# Patient Record
Sex: FEMALE | Race: WHITE | Employment: PART TIME | URBAN - METROPOLITAN AREA
[De-identification: names, ages, dates, MRNs, and addresses within clinical notes are randomized per-mention and may not be internally consistent; named-entity substitution may affect disease eponyms.]

---

## 2018-09-04 ENCOUNTER — APPOINTMENT (OUTPATIENT)
Dept: GENERAL RADIOLOGY | Age: 28
End: 2018-09-04
Payer: MEDICAID

## 2018-09-04 ENCOUNTER — HOSPITAL ENCOUNTER (EMERGENCY)
Age: 28
Discharge: HOME | End: 2018-09-04
Payer: MEDICAID

## 2018-09-04 VITALS
HEART RATE: 90 BPM | BODY MASS INDEX: 25.69 KG/M2 | OXYGEN SATURATION: 98 % | SYSTOLIC BLOOD PRESSURE: 141 MMHG | DIASTOLIC BLOOD PRESSURE: 91 MMHG | RESPIRATION RATE: 15 BRPM | HEIGHT: 63 IN | TEMPERATURE: 98.2 F | WEIGHT: 145 LBS
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
## 2018-09-04 DIAGNOSIS — M54.2 NECK PAIN: ICD-10-CM

## 2018-09-04 PROCEDURE — 6370000000 HC RX 637 (ALT 250 FOR IP): Performed by: PHYSICIAN ASSISTANT

## 2018-09-04 PROCEDURE — 99283 EMERGENCY DEPT VISIT LOW MDM: CPT

## 2018-09-04 PROCEDURE — 72040 X-RAY EXAM NECK SPINE 2-3 VW: CPT

## 2018-09-04 RX ORDER — CYCLOBENZAPRINE HCL 10 MG
10 TABLET ORAL ONCE
Status: DISCONTINUED | OUTPATIENT
Start: 2018-09-04 | End: 2018-09-04

## 2018-09-04 RX ORDER — METHOCARBAMOL 750 MG/1
750 TABLET, FILM COATED ORAL 4 TIMES DAILY
Qty: 20 TABLET | Refills: 0 | Status: SHIPPED | OUTPATIENT
Start: 2018-09-04 | End: 2018-09-14

## 2018-09-04 RX ORDER — IBUPROFEN 800 MG/1
800 TABLET ORAL EVERY 8 HOURS PRN
Qty: 30 TABLET | Refills: 0 | Status: SHIPPED | OUTPATIENT
Start: 2018-09-04 | End: 2020-08-15

## 2018-09-04 RX ORDER — TRAMADOL HYDROCHLORIDE 50 MG/1
50 TABLET ORAL ONCE
Status: COMPLETED | OUTPATIENT
Start: 2018-09-04 | End: 2018-09-04

## 2018-09-04 RX ORDER — METHOCARBAMOL 750 MG/1
750 TABLET, FILM COATED ORAL ONCE
Status: COMPLETED | OUTPATIENT
Start: 2018-09-04 | End: 2018-09-04

## 2018-09-04 RX ADMIN — METHOCARBAMOL 750 MG: 750 TABLET ORAL at 20:57

## 2018-09-04 RX ADMIN — TRAMADOL HYDROCHLORIDE 50 MG: 50 TABLET, FILM COATED ORAL at 20:58

## 2018-09-04 ASSESSMENT — PAIN DESCRIPTION - FREQUENCY: FREQUENCY: CONTINUOUS

## 2018-09-04 ASSESSMENT — PAIN DESCRIPTION - ONSET: ONSET: ON-GOING

## 2018-09-04 ASSESSMENT — PAIN DESCRIPTION - DESCRIPTORS: DESCRIPTORS: ACHING

## 2018-09-04 ASSESSMENT — PAIN DESCRIPTION - PROGRESSION: CLINICAL_PROGRESSION: NOT CHANGED

## 2018-09-04 ASSESSMENT — PAIN DESCRIPTION - LOCATION: LOCATION: HEAD;NECK

## 2018-09-04 ASSESSMENT — PAIN SCALES - GENERAL
PAINLEVEL_OUTOF10: 10
PAINLEVEL_OUTOF10: 10

## 2018-09-04 ASSESSMENT — PAIN DESCRIPTION - PAIN TYPE: TYPE: ACUTE PAIN
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
## 2018-09-10 ENCOUNTER — HOSPITAL ENCOUNTER (OUTPATIENT)
Dept: GENERAL RADIOLOGY | Age: 28
Discharge: HOME | End: 2018-09-10
Payer: MEDICAID

## 2018-09-10 ENCOUNTER — HOSPITAL ENCOUNTER (OUTPATIENT)
Age: 28
Discharge: HOME | End: 2018-09-10
Payer: MEDICAID

## 2018-09-10 DIAGNOSIS — M25.532 WRIST PAIN, LEFT: ICD-10-CM

## 2018-09-10 PROCEDURE — 73110 X-RAY EXAM OF WRIST: CPT

## 2018-09-11 ENCOUNTER — HOSPITAL ENCOUNTER (EMERGENCY)
Age: 28
Discharge: HOME | End: 2018-09-11
Payer: MEDICAID

## 2018-09-11 VITALS
OXYGEN SATURATION: 100 % | TEMPERATURE: 98.4 F | DIASTOLIC BLOOD PRESSURE: 91 MMHG | RESPIRATION RATE: 16 BRPM | HEIGHT: 63 IN | HEART RATE: 92 BPM | WEIGHT: 170 LBS | SYSTOLIC BLOOD PRESSURE: 132 MMHG | BODY MASS INDEX: 30.12 KG/M2

## 2018-09-11 DIAGNOSIS — M25.532 LEFT WRIST PAIN: Primary | ICD-10-CM

## 2018-09-11 PROCEDURE — 6370000000 HC RX 637 (ALT 250 FOR IP): Performed by: PHYSICIAN ASSISTANT

## 2018-09-11 PROCEDURE — 99283 EMERGENCY DEPT VISIT LOW MDM: CPT

## 2018-09-11 RX ORDER — HYDROCODONE BITARTRATE AND ACETAMINOPHEN 5; 325 MG/1; MG/1
1 TABLET ORAL ONCE
Status: COMPLETED | OUTPATIENT
Start: 2018-09-11 | End: 2018-09-11

## 2018-09-11 RX ORDER — NAPROXEN 500 MG/1
500 TABLET ORAL 2 TIMES DAILY
Qty: 20 TABLET | Refills: 0 | Status: SHIPPED | OUTPATIENT
Start: 2018-09-11 | End: 2020-12-19

## 2018-09-11 RX ADMIN — HYDROCODONE BITARTRATE AND ACETAMINOPHEN 1 TABLET: 5; 325 TABLET ORAL at 20:59

## 2018-09-11 ASSESSMENT — PAIN SCALES - GENERAL
PAINLEVEL_OUTOF10: 10
PAINLEVEL_OUTOF10: 10

## 2018-09-11 ASSESSMENT — PAIN DESCRIPTION - ORIENTATION: ORIENTATION: LEFT

## 2018-09-11 ASSESSMENT — PAIN DESCRIPTION - LOCATION: LOCATION: WRIST

## 2018-09-11 ASSESSMENT — PAIN DESCRIPTION - DESCRIPTORS: DESCRIPTORS: ACHING

## 2018-09-11 ASSESSMENT — PAIN DESCRIPTION - PAIN TYPE: TYPE: ACUTE PAIN

## 2018-09-12 NOTE — ED PROVIDER NOTES
Evaluated by advanced practice provider        [facility] ED  eMERGENCY dEPARTMENT eNCOUnter        Pt Name: Jacobo Sanchez  MRN: [id number]  [place] 1990  Date of evaluation: 9/11/2018  Provider: Claudette Montes De Oca PA-C  PCP: SHON Salguero CNP  ED Attending: No att. providers found    [facility]       Chief Complaint   Patient presents with    Wrist Pain     patient states that she has had wrist pain since saturday. patient states that she was draining noodles and twisted her wrsit wrong. patient states that she had x-ray done here yesturday       HISTORY OF PRESENT ILLNESS   (Location/Symptom, Timing/Onset, Context/Setting, Quality, Duration, Modifying Factors, Severity)  Note limiting factors. Jacobo Sanchez is a 25 y.o. female presents to the emergency department with left wrist pain for the past 3 days. The patient states that she was draining some noodles and twisted her wrist well. She was able to get hold of her PCP yesterday and had an x-ray of her left wrist done here that was unremarkable. She states that her pain is a 10 out of 10 sharp burning pain. She states that she was given anti-inflammatories and muscle relaxants with minimal relief. She states that she is having some tingling in her left middle finger and left thumb at times. Denies any weakness, but states that her pain is worse with movement. Nursing Notes were all reviewed and agreed with or any disagreements were addressed  in the HPI. REVIEW OF SYSTEMS    (2-9 systems for level 4, 10 or more for level 5)     Review of Systems    Positives and Pertinent negatives as per HPI. Except as noted above in the ROS, all other systems were reviewed and negative.        PAST MEDICAL HISTORY     Past Medical History:   Diagnosis Date    Abnormal Pap smear     2010, normal since    Anemia     Anemia     Ankle swelling     Anxiety disorder     Chest pain     Depression     Fatigue    

## 2019-12-18 ENCOUNTER — HOSPITAL ENCOUNTER (OUTPATIENT)
Dept: ULTRASOUND IMAGING | Age: 29
Discharge: HOME | End: 2019-12-18
Payer: MEDICAID

## 2019-12-18 DIAGNOSIS — R10.32 LEFT LOWER QUADRANT PAIN: ICD-10-CM

## 2019-12-18 PROCEDURE — 76856 US EXAM PELVIC COMPLETE: CPT

## 2020-08-15 ENCOUNTER — APPOINTMENT (OUTPATIENT)
Dept: CT IMAGING | Age: 30
End: 2020-08-15
Payer: MEDICAID

## 2020-08-15 ENCOUNTER — HOSPITAL ENCOUNTER (EMERGENCY)
Age: 30
Discharge: HOME | End: 2020-08-15
Attending: EMERGENCY MEDICINE
Payer: MEDICAID

## 2020-08-15 VITALS
WEIGHT: 170 LBS | OXYGEN SATURATION: 100 % | RESPIRATION RATE: 20 BRPM | HEIGHT: 64 IN | TEMPERATURE: 98.5 F | HEART RATE: 78 BPM | DIASTOLIC BLOOD PRESSURE: 67 MMHG | BODY MASS INDEX: 29.02 KG/M2 | SYSTOLIC BLOOD PRESSURE: 106 MMHG

## 2020-08-15 PROCEDURE — 96374 THER/PROPH/DIAG INJ IV PUSH: CPT

## 2020-08-15 PROCEDURE — 96375 TX/PRO/DX INJ NEW DRUG ADDON: CPT

## 2020-08-15 PROCEDURE — 6360000002 HC RX W HCPCS: Performed by: EMERGENCY MEDICINE

## 2020-08-15 PROCEDURE — 2580000003 HC RX 258: Performed by: EMERGENCY MEDICINE

## 2020-08-15 PROCEDURE — 6370000000 HC RX 637 (ALT 250 FOR IP): Performed by: EMERGENCY MEDICINE

## 2020-08-15 PROCEDURE — 72131 CT LUMBAR SPINE W/O DYE: CPT

## 2020-08-15 PROCEDURE — 99283 EMERGENCY DEPT VISIT LOW MDM: CPT

## 2020-08-15 RX ORDER — PREDNISONE 20 MG/1
TABLET ORAL
Qty: 21 TABLET | Refills: 0 | Status: SHIPPED | OUTPATIENT
Start: 2020-08-15 | End: 2022-07-16

## 2020-08-15 RX ORDER — 0.9 % SODIUM CHLORIDE 0.9 %
1000 INTRAVENOUS SOLUTION INTRAVENOUS ONCE
Status: COMPLETED | OUTPATIENT
Start: 2020-08-15 | End: 2020-08-15

## 2020-08-15 RX ORDER — KETOROLAC TROMETHAMINE 30 MG/ML
15 INJECTION, SOLUTION INTRAMUSCULAR; INTRAVENOUS ONCE
Status: COMPLETED | OUTPATIENT
Start: 2020-08-15 | End: 2020-08-15

## 2020-08-15 RX ORDER — ONDANSETRON 2 MG/ML
8 INJECTION INTRAMUSCULAR; INTRAVENOUS ONCE
Status: COMPLETED | OUTPATIENT
Start: 2020-08-15 | End: 2020-08-15

## 2020-08-15 RX ORDER — KETOROLAC TROMETHAMINE 10 MG/1
10 TABLET, FILM COATED ORAL EVERY 6 HOURS PRN
Qty: 20 TABLET | Refills: 0 | Status: SHIPPED | OUTPATIENT
Start: 2020-08-15

## 2020-08-15 RX ORDER — DEXAMETHASONE SODIUM PHOSPHATE 4 MG/ML
8 INJECTION, SOLUTION INTRA-ARTICULAR; INTRALESIONAL; INTRAMUSCULAR; INTRAVENOUS; SOFT TISSUE ONCE
Status: COMPLETED | OUTPATIENT
Start: 2020-08-15 | End: 2020-08-15

## 2020-08-15 RX ORDER — HYDROCODONE BITARTRATE AND ACETAMINOPHEN 5; 325 MG/1; MG/1
1 TABLET ORAL EVERY 6 HOURS PRN
Qty: 12 TABLET | Refills: 0 | Status: SHIPPED | OUTPATIENT
Start: 2020-08-15 | End: 2020-08-18

## 2020-08-15 RX ORDER — DIAZEPAM 5 MG/1
10 TABLET ORAL ONCE
Status: COMPLETED | OUTPATIENT
Start: 2020-08-15 | End: 2020-08-15

## 2020-08-15 RX ADMIN — KETOROLAC TROMETHAMINE 15 MG: 30 INJECTION, SOLUTION INTRAMUSCULAR at 14:30

## 2020-08-15 RX ADMIN — DEXAMETHASONE SODIUM PHOSPHATE 8 MG: 4 INJECTION, SOLUTION INTRAMUSCULAR; INTRAVENOUS at 15:31

## 2020-08-15 RX ADMIN — ONDANSETRON 8 MG: 2 INJECTION INTRAMUSCULAR; INTRAVENOUS at 14:30

## 2020-08-15 RX ADMIN — DIAZEPAM 10 MG: 5 TABLET ORAL at 14:30

## 2020-08-15 RX ADMIN — SODIUM CHLORIDE 1000 ML: 9 INJECTION, SOLUTION INTRAVENOUS at 14:34

## 2020-08-15 ASSESSMENT — PAIN SCALES - GENERAL
PAINLEVEL_OUTOF10: 10
PAINLEVEL_OUTOF10: 6
PAINLEVEL_OUTOF10: 10
PAINLEVEL_OUTOF10: 9

## 2020-08-15 ASSESSMENT — PAIN - FUNCTIONAL ASSESSMENT: PAIN_FUNCTIONAL_ASSESSMENT: 0-10
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
## 2020-08-15 NOTE — ED NOTES
All discharge paperwork and follow-up instructions reviewed with pt. Pt verbalized understanding.  Pt discharged via wheelchair in stable condition to private vehicle with Chrissie Méndez RN  08/15/20 [id number]

## 2020-12-19 ENCOUNTER — HOSPITAL ENCOUNTER (EMERGENCY)
Age: 30
Discharge: HOME | End: 2020-12-19
Payer: MEDICAID

## 2020-12-19 VITALS
RESPIRATION RATE: 16 BRPM | WEIGHT: 168 LBS | OXYGEN SATURATION: 99 % | HEIGHT: 63 IN | TEMPERATURE: 98.4 F | HEART RATE: 99 BPM | DIASTOLIC BLOOD PRESSURE: 89 MMHG | BODY MASS INDEX: 29.77 KG/M2 | SYSTOLIC BLOOD PRESSURE: 130 MMHG

## 2020-12-19 PROCEDURE — 6370000000 HC RX 637 (ALT 250 FOR IP): Performed by: NURSE PRACTITIONER

## 2020-12-19 PROCEDURE — 99284 EMERGENCY DEPT VISIT MOD MDM: CPT

## 2020-12-19 RX ORDER — SULFAMETHOXAZOLE AND TRIMETHOPRIM 800; 160 MG/1; MG/1
1 TABLET ORAL ONCE
Status: COMPLETED | OUTPATIENT
Start: 2020-12-19 | End: 2020-12-19

## 2020-12-19 RX ORDER — AMOXICILLIN AND CLAVULANATE POTASSIUM 875; 125 MG/1; MG/1
1 TABLET, FILM COATED ORAL ONCE
Status: COMPLETED | OUTPATIENT
Start: 2020-12-19 | End: 2020-12-19

## 2020-12-19 RX ORDER — AMOXICILLIN AND CLAVULANATE POTASSIUM 875; 125 MG/1; MG/1
1 TABLET, FILM COATED ORAL 2 TIMES DAILY
Qty: 20 TABLET | Refills: 0 | Status: SHIPPED | OUTPATIENT
Start: 2020-12-19 | End: 2020-12-29

## 2020-12-19 RX ORDER — SULFAMETHOXAZOLE AND TRIMETHOPRIM 800; 160 MG/1; MG/1
1 TABLET ORAL 2 TIMES DAILY
Qty: 20 TABLET | Refills: 0 | Status: SHIPPED | OUTPATIENT
Start: 2020-12-19 | End: 2020-12-29

## 2020-12-19 RX ADMIN — AMOXICILLIN AND CLAVULANATE POTASSIUM 1 TABLET: 875; 125 TABLET, FILM COATED ORAL at 22:49

## 2020-12-19 RX ADMIN — SULFAMETHOXAZOLE AND TRIMETHOPRIM 1 TABLET: 800; 160 TABLET ORAL at 22:49

## 2020-12-19 ASSESSMENT — PAIN SCALES - GENERAL: PAINLEVEL_OUTOF10: 9

## 2020-12-20 NOTE — ED PROVIDER NOTES
 CYST REMOVAL Right     breast    HYSTERECTOMY, VAGINAL  3/23/16    total vaginal hysterectomy    LEEP  1/14/16    TUBAL LIGATION         CURRENT MEDICATIONS  (may include discharge medications prescribed in the ED)  Current Outpatient Rx   Medication Sig Dispense Refill    amoxicillin-clavulanate (AUGMENTIN) 875-125 MG per tablet Take 1 tablet by mouth 2 times daily for 10 days 20 tablet 0    sulfamethoxazole-trimethoprim (BACTRIM DS) 800-160 MG per tablet Take 1 tablet by mouth 2 times daily for 10 days 20 tablet 0    naproxen (NAPROSYN) 500 MG tablet Take 1 tablet by mouth 2 times daily for 20 doses 20 tablet 0    predniSONE (DELTASONE) 20 MG tablet Take 3 tabs by mouth for three days (days 1-3). Take 2 tabs by mouth for three days (days 4-6)  Take 1 tab by mouth for four days (days 7-10)  Take 0.5 tabs by mouth for four days (days 11-14) 21 tablet 0    ketorolac (TORADOL) 10 MG tablet Take 1 tablet by mouth every 6 hours as needed for Pain Do not take ibuprofen (Motrin or Advil) or naproxen (Aleve) while taking this medication.  20 tablet 0       ALLERGIES    No Known Allergies    SOCIAL & FAMILY HISTORY    Social History     Socioeconomic History    Marital status: Legally      Spouse name: None    Number of children: None    Years of education: None    Highest education level: None   Occupational History    None   Social Needs    Financial resource strain: None    Food insecurity     Worry: None     Inability: None    Transportation needs     Medical: None     Non-medical: None   Tobacco Use    Smoking status: Current Every Day Smoker     Packs/day: 0.50     Years: 7.00     Pack years: 3.50     Types: Cigarettes    Smokeless tobacco: Never Used    Tobacco comment: tried chantix, not interested in quitting,    Substance and Sexual Activity    Alcohol use: Yes     Comment: 1-2 drinks per year    Drug use: No    Sexual activity: Yes     Partners: Male   Lifestyle    Physical activity     Days per week: None     Minutes per session: None    Stress: None   Relationships    Social connections     Talks on phone: None     Gets together: None     Attends [employer] service: None     Active member of club or organization: None     Attends meetings of clubs or organizations: None     Relationship status: None    Intimate partner violence     Fear of current or ex partner: None     Emotionally abused: None     Physically abused: None     Forced sexual activity: None   Other Topics Concern    None   Social History Narrative    None     Family History   Problem Relation Age of Onset    Diabetes Mother     High Blood Pressure Mother     Stroke Mother     Asthma Sister     Arthritis Maternal Grandmother     Cancer Maternal Grandmother         breast    Arthritis Maternal Grandfather     Arthritis Paternal Grandmother     Arthritis Paternal Grandfather     Cancer Paternal Grandfather     Mental Illness Father        PHYSICAL EXAM    VITAL SIGNS: /89   Pulse 99   Temp 98.4 °F (36.9 °C) (Oral)   Resp 16   Ht 5' 3\" (1.6 m)   Wt 168 lb (76.2 kg)   LMP 03/16/2016   SpO2 99%   BMI 29.76 kg/m²   Constitutional:  Well developed, well nourished, no acute distress  HENT:  Atraumatic, no facial or lip swelling  Oral:  No tongue swelling, airway patent  Neck: no swelling  Respiratory:  No respiratory distress, breathing comfortably  Cardiovascular:  no JVD   Musculoskeletal:  No edema, no acute deformities  Vascular: left pedal pulse 2+  Integument:  + 1 cm area of tenderness, induration, and fluctuance located left buttock into the gluteal celft, immediate area of surrounding erythema    RADIOLOGY  No orders to display       PROCEDURE  Deferred, already spontaneously ruptured and drained      ED COURSE & MEDICAL DECISION MAKING    See chart for details of medications prescribed.     Vitals:    12/19/20 2210 12/19/20 2220   BP:  130/89   Pulse: 109 99   Resp:  16   Temp:  98.4 °F 03/16/2016, SpO2 99 %, not currently breastfeeding. I instructed the patient to follow up in 2 days for wound recheck. The patient verbalized understanding, they are in agreement with the plan of discharge and have no further questions or concerns. FINAL IMPRESSION    1.  Abscess of buttock, left        PLAN  Discharge with close outpatient follow-up (see EMR)       (Please note that this note was completed with a voice recognition program.  Every attempt was made to edit the dictations, but inevitably there remain words that are mis-transcribed.)         Haven Hebert, SHON - CNP  12/19/20 0945

## 2020-12-20 NOTE — ED NOTES
Pt scripts x2 instructed to follow up with PCP for wound recheck. Assessed per Rhys Kerr NP.      Elio Avendaño LPN  37/53/65 [id number]

## 2022-07-16 ENCOUNTER — HOSPITAL ENCOUNTER (EMERGENCY)
Age: 32
Discharge: HOME | End: 2022-07-17
Payer: MEDICAID

## 2022-07-16 ENCOUNTER — APPOINTMENT (OUTPATIENT)
Dept: GENERAL RADIOLOGY | Age: 32
End: 2022-07-16
Payer: MEDICAID

## 2022-07-16 DIAGNOSIS — M25.511 ACUTE PAIN OF RIGHT SHOULDER: Primary | ICD-10-CM

## 2022-07-16 PROCEDURE — 73030 X-RAY EXAM OF SHOULDER: CPT

## 2022-07-16 PROCEDURE — 6370000000 HC RX 637 (ALT 250 FOR IP): Performed by: REGISTERED NURSE

## 2022-07-16 PROCEDURE — 99283 EMERGENCY DEPT VISIT LOW MDM: CPT

## 2022-07-16 RX ORDER — PREDNISONE 20 MG/1
40 TABLET ORAL ONCE
Status: COMPLETED | OUTPATIENT
Start: 2022-07-17 | End: 2022-07-16

## 2022-07-16 RX ORDER — METHYLPREDNISOLONE 4 MG/1
TABLET ORAL
Qty: 1 KIT | Refills: 0 | Status: SHIPPED | OUTPATIENT
Start: 2022-07-16 | End: 2022-07-22

## 2022-07-16 RX ORDER — LIDOCAINE 4 G/G
1 PATCH TOPICAL ONCE
Status: DISCONTINUED | OUTPATIENT
Start: 2022-07-16 | End: 2022-07-17 | Stop reason: HOSPADM

## 2022-07-16 RX ORDER — LIDOCAINE 4 G/G
1 PATCH TOPICAL EVERY 24 HOURS
Qty: 30 PATCH | Refills: 0 | Status: SHIPPED | OUTPATIENT
Start: 2022-07-16 | End: 2022-08-15

## 2022-07-16 RX ORDER — METHOCARBAMOL 500 MG/1
500 TABLET, FILM COATED ORAL 4 TIMES DAILY PRN
Qty: 20 TABLET | Refills: 0 | Status: SHIPPED | OUTPATIENT
Start: 2022-07-16 | End: 2022-07-21

## 2022-07-16 RX ORDER — IBUPROFEN 600 MG/1
600 TABLET ORAL ONCE
Status: COMPLETED | OUTPATIENT
Start: 2022-07-16 | End: 2022-07-16

## 2022-07-16 RX ADMIN — PREDNISONE 40 MG: 20 TABLET ORAL at 23:59

## 2022-07-16 RX ADMIN — IBUPROFEN 600 MG: 600 TABLET ORAL at 23:03

## 2022-07-16 ASSESSMENT — PAIN DESCRIPTION - ORIENTATION: ORIENTATION: RIGHT

## 2022-07-16 ASSESSMENT — PAIN DESCRIPTION - LOCATION: LOCATION: SHOULDER

## 2022-07-16 ASSESSMENT — PAIN - FUNCTIONAL ASSESSMENT: PAIN_FUNCTIONAL_ASSESSMENT: 0-10

## 2022-07-16 ASSESSMENT — PAIN SCALES - GENERAL: PAINLEVEL_OUTOF10: 9

## 2022-07-16 ASSESSMENT — PAIN DESCRIPTION - PAIN TYPE: TYPE: ACUTE PAIN

## 2022-07-17 VITALS
BODY MASS INDEX: 30.65 KG/M2 | HEART RATE: 77 BPM | SYSTOLIC BLOOD PRESSURE: 120 MMHG | WEIGHT: 173 LBS | TEMPERATURE: 98.3 F | DIASTOLIC BLOOD PRESSURE: 68 MMHG | RESPIRATION RATE: 15 BRPM | OXYGEN SATURATION: 99 % | HEIGHT: 63 IN

## 2022-07-17 ASSESSMENT — ENCOUNTER SYMPTOMS
CONSTIPATION: 0
COUGH: 0
TROUBLE SWALLOWING: 0
SHORTNESS OF BREATH: 0
VOMITING: 0
RHINORRHEA: 0
SORE THROAT: 0
NAUSEA: 0
DIARRHEA: 0
BACK PAIN: 0
ABDOMINAL PAIN: 0

## 2022-07-17 NOTE — DISCHARGE INSTRUCTIONS
You may alternate with Tylenol and ibuprofen for pain. Please begin your prescription of steroids. While taking the steroids do not take any anti-inflammatory such as ibuprofen or naproxen. May use the Robaxin for muscle relaxation but please do not operate heavy machinery or drive while taking. Please follow-up with orthopedics for your shoulder pain.

## 2022-07-17 NOTE — ED PROVIDER NOTES
[address] [facility]  EMERGENCY DEPARTMENT ENCOUNTER        Pt Name: Paola Gilmore  MRN: [id number]  [place] 1990  Date of evaluation: 7/16/2022  Provider: SHON Young CNP  PCP: No primary care provider on file. This patient was not seen and evaluated by the attending physician     I have evaluated this patient. My supervising physician was available for consultation. CHIEF COMPLAINT       Chief Complaint   Patient presents with    Shoulder Pain     Reports for the past 5 days, right shoulder and underarm. Reports pain. Denies any know injury        HISTORY OF PRESENT ILLNESS   (Location/Symptom, Timing/Onset, Context/Setting, Quality, Duration, Modifying Factors, Severity)  Note limiting factors. Paola Gilmore is a 27 y.o. female who presents via private car for right shoulder pain. Onset was 5 days ago. Duration has been since the onset. 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 She states approximately 5 days ago she began noticing pain after her shifts. She denies any significant injury. She states her pain is worse with range of motion and better with rest.  She denies any chest pain, shortness of breath, injuries to the shoulder or other symptoms. Does endorse that yesterday and today the pain is begun radiating to her right arm and up into her neck. She denies any pain to her midline spinous process. She denies any recent illness including fevers. . Quality is aching with radiation to her arm and lateral neck. Alleviating factors include rest and medications. Aggravating factors include movement and palpation. Pain is 9/10. Lidocaine, ibuprofen has been used for pain today. Chart review reveals pt has significant PMHx of depression, anemia, abnormal Pap smear, anxiety. They take no medications. Nursing Notes were all reviewed and agreed with or any disagreements were addressed  in the HPI.     Pt was seen during the COVID 19 pandemic. Appropriate PPE worn by ME during patient encounters. Pt seen during a time with constrained hospital bed capacity and other potential inpatient and outpatient resources were constrained due to the viral pandemic. REVIEW OF SYSTEMS    (2-9 systems for level 4, 10 or more for level 5)     Review of Systems   Constitutional:  Negative for chills, diaphoresis and fever. HENT:  Negative for congestion, rhinorrhea, sore throat, tinnitus and trouble swallowing. Respiratory:  Negative for cough and shortness of breath. Cardiovascular:  Negative for chest pain and leg swelling. Gastrointestinal:  Negative for abdominal pain, constipation, diarrhea, nausea and vomiting. Musculoskeletal:  Positive for arthralgias and neck pain. Negative for back pain and neck stiffness. Right shoulder pain, right lateral neck pain   Skin:  Negative for rash and wound. Positives and Pertinent negatives as per HPI. Except as noted abovein the ROS, all other systems were reviewed and negative.        PAST MEDICAL HISTORY     Past Medical History:   Diagnosis Date    Abnormal Pap smear     2010, normal since    Anemia     Anemia     Ankle swelling     Anxiety disorder     Chest pain     Depression     Fatigue     Irregular heart beat     Joint pain     Mental disorder     Morning stiffness of joints     MRSA (methicillin resistant staph aureus) culture positive 3/14/13    abscess buttock    Muscle tenderness     Muscle weakness     Postpartum depression     2009    Pregnancy     Trauma     2006/2007/2008    UTI (lower urinary tract infection)     reoccuring    Weakness          SURGICAL HISTORY     Past Surgical History:   Procedure Laterality Date    COLPOSCOPY  03/11/11    CYST REMOVAL Right     breast    HYSTERECTOMY, VAGINAL  3/23/16    total vaginal hysterectomy    LEEP  1/14/16    TUBAL LIGATION           CURRENTMEDICATIONS       Discharge Medication List as of 7/16/2022 11:52 PM CONTINUE these medications which have NOT CHANGED    Details   ketorolac (TORADOL) 10 MG tablet Take 1 tablet by mouth every 6 hours as needed for Pain Do not take ibuprofen (Motrin or Advil) or naproxen (Aleve) while taking this medication. , Disp-20 tablet,R-0Print      naproxen (NAPROSYN) 500 MG tablet Take 1 tablet by mouth 2 times daily for 20 doses, Disp-20 tablet, R-0Print               ALLERGIES     Patient has no known allergies. FAMILYHISTORY       Family History   Problem Relation Age of Onset    Diabetes Mother     High Blood Pressure Mother     Stroke Mother     Asthma Sister     Arthritis Maternal Grandmother     Cancer Maternal Grandmother         breast    Arthritis Maternal Grandfather     Arthritis Paternal Grandmother     Arthritis Paternal Grandfather     Cancer Paternal Grandfather     Mental Illness Father           SOCIAL HISTORY       Social History     Socioeconomic History    Marital status: Single     Spouse name: None    Number of children: None    Years of education: None    Highest education level: None   Tobacco Use    Smoking status: Every Day     Packs/day: 0.50     Years: 7.00     Pack years: 3.50     Types: Cigarettes    Smokeless tobacco: Never    Tobacco comments:     tried chantix, not interested in quitting,    Substance and Sexual Activity    Alcohol use: Yes     Comment: 1-2 drinks per year    Drug use: No    Sexual activity: Yes     Partners: Male       SCREENINGS    Bekah Coma Scale  Eye Opening: Spontaneous  Best Verbal Response: Oriented  Best Motor Response: Obeys commands  Bekah Coma Scale Score: 15        PHYSICAL EXAM    (up to 7 for level 4, 8 or more for level 5)     ED Triage Vitals [07/16/22 2209]   BP Temp Temp Source Heart Rate Resp SpO2 Height Weight   119/81 98.3 °F (36.8 °C) Oral 83 18 97 % 5' 3\" (1.6 m) 173 lb (78.5 kg)       Physical Exam  Vitals and nursing note reviewed. Constitutional:       Appearance: Normal appearance.  She is not ill-appearing or diaphoretic. HENT:      Head: Normocephalic and atraumatic. Right Ear: External ear normal.      Left Ear: External ear normal.   Eyes:      General:         Right eye: No discharge. Left eye: No discharge. Neck:      Trachea: Trachea and phonation normal.        Comments: Astrid palpable abnormality. No swelling, warmth, erythema noted. Patient does not exhibit any difficulty swallowing or tolerating oral secretions. No spinous process tenderness. Pulmonary:      Effort: Pulmonary effort is normal. No respiratory distress. Musculoskeletal:         General: Tenderness present. Right shoulder: Tenderness and bony tenderness present. No swelling, deformity, effusion, laceration or crepitus. Decreased range of motion. Normal strength. Normal pulse. Right upper arm: No swelling, edema, deformity, lacerations, tenderness or bony tenderness. Right hand: Normal.        Arms:       Cervical back: Full passive range of motion without pain, normal range of motion and neck supple. No edema, erythema, signs of trauma, rigidity, torticollis or crepitus. Muscular tenderness present. No spinous process tenderness. Normal range of motion. Comments: Strength, sensation and pulses are intact and equal when compared bilaterally. Pain with range of motion to the right shoulder when arm is raised above the shoulder. Obvious bony step-offs or abnormalities. Skin:     General: Skin is warm and dry. Capillary Refill: Capillary refill takes less than 2 seconds. Findings: No rash. Neurological:      General: No focal deficit present. Mental Status: She is alert and oriented to person, place, and time.    Psychiatric:         Mood and Affect: Mood normal.         Behavior: Behavior normal.     PHYSICAL EXAM  /68   Pulse 77   Temp 98.3 °F (36.8 °C) (Oral)   Resp 15   Ht 5' 3\" (1.6 m)   Wt 173 lb (78.5 kg)   LMP 03/16/2016   SpO2 99%   BMI 30.65 kg/m²       DIAGNOSTIC RESULTS   LABS:    Labs Reviewed - No data to display    All other labs were within normal range or not returned as of this dictation. EKG: All EKG's are interpreted by the Emergency Department Physician who either signs orCo-signs this chart in the absence of a cardiologist.  Please see their note for interpretation of EKG. RADIOLOGY:   Non-plain film images such as CT, Ultrasound and MRI are read by the radiologist. Plain radiographic images are visualized andpreliminarily interpreted by the  ED Provider with the below findings:        Interpretation perthe Radiologist below, if available at the time of this note:    XR SHOULDER RIGHT (MIN 2 VIEWS)   Final Result   1. Slight superior subluxation of the right humeral head compared to the   prior study could be seen with a right rotator cuff injury. 2. Otherwise no acute findings nor significant degenerative changes in the   right shoulder. XR SHOULDER RIGHT (MIN 2 VIEWS)    Result Date: 7/16/2022  EXAMINATION: THREE XRAY VIEWS OF THE RIGHT SHOULDER 7/16/2022 10:34 pm COMPARISON: Right shoulder radiograph 09/26/2015 HISTORY: ORDERING SYSTEM PROVIDED HISTORY: pain w/rom TECHNOLOGIST PROVIDED HISTORY: Reason for exam:->pain w/rom Reason for Exam: rt shoulder pain w/o inj, pain for 5 days, stiffness FINDINGS: No acute fracture. Slight superior subluxation of the humeral head with respect to the scapular glenoid process compared to the prior study. Joints otherwise maintain anatomic alignment. No significant degenerative changes. No obvious acute soft tissue abnormality. 1. Slight superior subluxation of the right humeral head compared to the prior study could be seen with a right rotator cuff injury. 2. Otherwise no acute findings nor significant degenerative changes in the right shoulder.           PROCEDURES   Unless otherwise noted below, none     Procedures    CRITICAL CARE TIME hours off., TransDERmal, EVERY 24 HOURS Starting Sat 7/16/2022, Until Mon 8/15/2022, For 30 days, Disp-30 patch, R-0, Normal      methylPREDNISolone (MEDROL DOSEPACK) 4 MG tablet Take by mouth., Disp-1 kit, R-0Normal             DISCONTINUED MEDICATIONS:  Discharge Medication List as of 7/16/2022 11:52 PM        STOP taking these medications       predniSONE (DELTASONE) 20 MG tablet Comments:   Reason for Stopping:                      (Please note that portions ofthis note were completed with a voice recognition program.  Efforts were made to edit the dictations but occasionally words are mis-transcribed.)    SHON Matias CNP (electronically signed)       SHON Matias CNP  07/17/22 0109

## 2022-12-02 ENCOUNTER — TELEPHONE (OUTPATIENT)
Dept: ORTHOPEDIC SURGERY | Age: 32
End: 2022-12-02

## 2022-12-02 NOTE — TELEPHONE ENCOUNTER
LM for patient that we need to cancel her appt with DR rosenbaum on 12/15/22 as he will be out of office for surgery. Provided her with the [phone] to reschedule and informed her she could see Liane Vigil sooner for an appointment to start her care.

## 2023-01-04 ENCOUNTER — OFFICE VISIT (OUTPATIENT)
Dept: ORTHOPEDIC SURGERY | Age: 33
End: 2023-01-04
Payer: MEDICAID

## 2023-01-04 VITALS — BODY MASS INDEX: 30.65 KG/M2 | WEIGHT: 173 LBS | HEIGHT: 63 IN

## 2023-01-04 DIAGNOSIS — M47.812 CERVICAL SPONDYLOSIS: Primary | ICD-10-CM

## 2023-01-04 PROCEDURE — 99203 OFFICE O/P NEW LOW 30 MIN: CPT | Performed by: ORTHOPAEDIC SURGERY

## 2023-01-04 PROCEDURE — 4004F PT TOBACCO SCREEN RCVD TLK: CPT | Performed by: ORTHOPAEDIC SURGERY

## 2023-01-04 PROCEDURE — G8427 DOCREV CUR MEDS BY ELIG CLIN: HCPCS | Performed by: ORTHOPAEDIC SURGERY

## 2023-01-04 PROCEDURE — G8417 CALC BMI ABV UP PARAM F/U: HCPCS | Performed by: ORTHOPAEDIC SURGERY

## 2023-01-04 PROCEDURE — G8484 FLU IMMUNIZE NO ADMIN: HCPCS | Performed by: ORTHOPAEDIC SURGERY

## 2023-01-04 NOTE — PROGRESS NOTES
New Patient: CERVICAL SPINE    Referring Provider:  No ref. provider found    CHIEF COMPLAINT: Neck pain    HISTORY OF PRESENT ILLNESS:   Ms. Cassandra Martinez is a pleasant 27 y.o. old female presents today for the evaluation of neck and right than left arm pain. Her most recent symptoms began 3 months ago insidiously. Her pain is increased since that time she rates her neck pain 8/10 to bilateral trap pain 7/10. She also has 6/10 low back pain. She notes numbness and tingling about her shoulders and occasional weakness of her arms. She denies loss of fine motor control. Current/Past Treatment:   Physical Therapy: No  Chiropractic: Yes  Injection: No  Medications: NSAIDs    Past Medical History:   Past Medical History:   Diagnosis Date    Abnormal Pap smear     2010, normal since    Anemia     Anemia     Ankle swelling     Anxiety disorder     Chest pain     Depression     Fatigue     Irregular heart beat     Joint pain     Mental disorder     Morning stiffness of joints     MRSA (methicillin resistant staph aureus) culture positive 3/14/13    abscess buttock    Muscle tenderness     Muscle weakness     Postpartum depression     2009    Pregnancy     Trauma     2006/2007/2008    UTI (lower urinary tract infection)     reoccuring    Weakness       Past Surgical History:     Past Surgical History:   Procedure Laterality Date    COLPOSCOPY  03/11/11    CYST REMOVAL Right     breast    HYSTERECTOMY, VAGINAL  3/23/16    total vaginal hysterectomy    LEEP  1/14/16    TUBAL LIGATION       Current Medications:     Current Outpatient Medications:     ketorolac (TORADOL) 10 MG tablet, Take 1 tablet by mouth every 6 hours as needed for Pain Do not take ibuprofen (Motrin or Advil) or naproxen (Aleve) while taking this medication. , Disp: 20 tablet, Rfl: 0    naproxen (NAPROSYN) 500 MG tablet, Take 1 tablet by mouth 2 times daily for 20 doses, Disp: 20 tablet, Rfl: 0  Allergies:  Patient has no known allergies.   Social History:    reports that she has been smoking cigarettes. She has a 3.50 pack-year smoking history. She has never used smokeless tobacco. She reports current alcohol use. She reports that she does not use drugs. Family History:   Family History   Problem Relation Age of Onset    Diabetes Mother     High Blood Pressure Mother     Stroke Mother     Asthma Sister     Arthritis Maternal Grandmother     Cancer Maternal Grandmother         breast    Arthritis Maternal Grandfather     Arthritis Paternal Grandmother     Arthritis Paternal Grandfather     Cancer Paternal Grandfather     Mental Illness Father        REVIEW OF SYSTEMS: Full ROS noted & scanned   CONSTITUTIONAL: Denies unexplained weight loss, fevers, chills or fatigue  NEUROLOGICAL: Denies unsteady gait or progressive weakness  MUSCULOSKELETAL: Denies joint swelling or redness  PSYCHOLOGICAL: Denies anxiety, depression   SKIN: Denies skin changes, delayed healing, rash, itching   HEMATOLOGIC: Denies easy bleeding or bruising  ENDOCRINE: Denies excessive thirst, urination, heat/cold  RESPIRATORY: Denies current dyspnea, cough  GI: Denies nausea, vomiting, diarrhea   : Denies bowel or bladder issues       PHYSICAL EXAM:    Vitals: Last menstrual period 03/16/2016, not currently breastfeeding. GENERAL EXAM:  General Apparence: Patient is adequately groomed with no evidence of malnutrition. Orientation: The patient is oriented to time, place and person. Mood & Affect:The patient's mood and affect are appropriate   Vascular: Examination reveals no swelling tenderness in upper or lower extremities. Good capillary refill  Lymphatic: The lymphatic examination bilaterally reveals all areas to be without enlargement or induration  Sensation: Sensation is intact without deficit  Coordination/Balance: Good coordination     CERVICAL EXAMINATION:  Inspection: Local inspection shows no step-off or bruising.   Cervical alignment is normal.     Palpation: No evidence of tenderness at the midline, and trapezius. Paraspinal tenderness is present. There is no step-off or paraspinal spasm. Range of Motion: Cervical flexion, extension, and rotation are mildly reduced with pain. Strength: 5/5 bilateral upper extremities   Special Tests:     Sweet's negative bilaterally. Cubital and Carpal tunnel Tinel's negative bilaterally. Skin:There are no rashes, ulcerations or lesions in right & left upper extremities. Reflexes: Bilaterally triceps, biceps and brachioradialis are 2+. Clonus absent bilaterally at the feet. Gait & station: normal, patient ambulates without assistance     Additional Examinations:       RIGHT UPPER EXTREMITY:  Inspection/examination of the right upper extremity does not show any tenderness, deformity or injury. Range of motion is unremarkable. There is no gross instability. There are no rashes, ulcerations or lesions. Strength and tone are normal.  LEFT UPPER EXTREMITY: Inspection/examination of the left upper extremity does not show any tenderness, deformity or injury. Range of motion is unremarkable. There is no gross instability. There are no rashes, ulcerations or lesions. Strength and tone are normal.    Diagnostic Testing:  Reviewed AP and lateral x-rays of her cervical spine from 9/4/2018 in the office today. Those are essentially normal    I reviewed CT images of her lumbar spine from 8/15/2020 in the office today. Those show a small central disc protrusion L5-S1    Reviewed AP and lateral x-rays of her right shoulder from 7/16/2022 in the office today. Those show no significant degenerative changes    Reviewed MRI images of her cervical spine 10/27/2022 in the office today.   Those show a right paracentral disc herniation C5-C6 and a congenital fusion C2-C3    Impression:   Right paracentral disc herniation C5-C6    Plan:    Discussed treatment options including observation, physical therapy, medications, epidural injections and additional imaging. She would like to proceed with physical therapy.   She may call to schedule a cervical epidural

## 2023-03-27 ENCOUNTER — HOSPITAL ENCOUNTER (EMERGENCY)
Age: 33
Discharge: HOME | End: 2023-03-28
Payer: MEDICAID

## 2023-03-27 VITALS
OXYGEN SATURATION: 99 % | DIASTOLIC BLOOD PRESSURE: 90 MMHG | SYSTOLIC BLOOD PRESSURE: 113 MMHG | TEMPERATURE: 98.1 F | RESPIRATION RATE: 18 BRPM | HEART RATE: 79 BPM

## 2023-03-27 DIAGNOSIS — N76.2 CELLULITIS OF LABIA: Primary | ICD-10-CM

## 2023-03-27 PROCEDURE — 99283 EMERGENCY DEPT VISIT LOW MDM: CPT

## 2023-03-27 RX ORDER — CEPHALEXIN 500 MG/1
500 CAPSULE ORAL ONCE
Status: COMPLETED | OUTPATIENT
Start: 2023-03-27 | End: 2023-03-28

## 2023-03-27 RX ORDER — SULFAMETHOXAZOLE AND TRIMETHOPRIM 800; 160 MG/1; MG/1
1 TABLET ORAL ONCE
Status: COMPLETED | OUTPATIENT
Start: 2023-03-27 | End: 2023-03-28

## 2023-03-27 ASSESSMENT — PAIN - FUNCTIONAL ASSESSMENT: PAIN_FUNCTIONAL_ASSESSMENT: 0-10

## 2023-03-27 ASSESSMENT — PAIN DESCRIPTION - LOCATION: LOCATION: GROIN

## 2023-03-27 ASSESSMENT — PAIN SCALES - GENERAL: PAINLEVEL_OUTOF10: 10

## 2023-03-28 PROCEDURE — 6370000000 HC RX 637 (ALT 250 FOR IP): Performed by: PHYSICIAN ASSISTANT

## 2023-03-28 RX ORDER — SULFAMETHOXAZOLE AND TRIMETHOPRIM 800; 160 MG/1; MG/1
1 TABLET ORAL 2 TIMES DAILY
Qty: 14 TABLET | Refills: 0 | Status: SHIPPED | OUTPATIENT
Start: 2023-03-28 | End: 2023-04-04

## 2023-03-28 RX ORDER — CEPHALEXIN 500 MG/1
500 CAPSULE ORAL 4 TIMES DAILY
Qty: 28 CAPSULE | Refills: 0 | Status: SHIPPED | OUTPATIENT
Start: 2023-03-28 | End: 2023-04-04

## 2023-03-28 RX ADMIN — CEPHALEXIN 500 MG: 500 CAPSULE ORAL at 00:30

## 2023-03-28 RX ADMIN — SULFAMETHOXAZOLE AND TRIMETHOPRIM 1 TABLET: 800; 160 TABLET ORAL at 00:30

## 2023-03-28 ASSESSMENT — ENCOUNTER SYMPTOMS
NAUSEA: 0
GASTROINTESTINAL NEGATIVE: 1
VOMITING: 0

## 2023-03-28 ASSESSMENT — PAIN SCALES - GENERAL: PAINLEVEL_OUTOF10: 3

## 2023-03-28 ASSESSMENT — PAIN DESCRIPTION - LOCATION: LOCATION: GROIN

## 2023-03-28 NOTE — DISCHARGE INSTRUCTIONS
Please continue with Keflex and Bactrim as prescribed. Please follow-up with your gynecologist.  Return to the ED if you develop any new or worsening symptoms.

## 2023-03-28 NOTE — ED PROVIDER NOTES
discussed)  None          Records Reviewed (External and Source) none    CC/HPI Summary, DDx, ED Course, and Reassessment:     Patient brought in today by private vehicle with complaints of \"a possible abscess to her vagina\". On exam she is well-appearing alert oriented afebrile breathing on room air satting at 99%. Nontoxic. No acute respiratory distress. Old labs records reviewed. Patient seen and evaluated by myself and my attending was available as needed. Physical exam findings consistent with cellulitis to the labia small area of induration without evidence of fluctuance noted. Patient given p.o. Keflex and p.o. Bactrim. At this time I do not feel as though she would benefit from incision and drainage as there is no area of fluctuance or concern for abscess. Did discuss this with the patient and she was agreeable to this plan. Plan at this time will be to discharge home. Patient will be given Keflex and Bactrim for home. Disposition Considerations (tests considered but not done, Admit vs D/C, Shared Decision Making, Pt Expectation of Test or Tx.):     Will be discharged home with close follow-up to gynecology. Recommended continuing with sits baths at home as well as Tylenol ibuprofen for pain control. Patient was given Keflex and Bactrim for home. Instructed to return to the ED if she developed any new or worsening symptoms. She vocalized understanding. Patient discharged in stable condition. I am the Primary Clinician of Record. FINAL IMPRESSION      1.  Cellulitis of labia          DISPOSITION/PLAN     DISPOSITION Decision To Discharge 03/28/2023 12:12:56 AM      PATIENT REFERRED TO:  [place] ([place]) [facility] [place] PHYSICAL [facility] ED  [address]  [phone]  Schedule an appointment as soon as possible for a visit   As needed, If symptoms worsen    DISCHARGE MEDICATIONS:  Discharge Medication List as of 3/28/2023 12:22 AM        START taking these medications    Details

## 2023-03-28 NOTE — ED NOTES
--Patient provided with discharge instructions and any prescriptions. --Instructions, dosing, and follow-up appointments reviewed with patient/family. No further questions or needs at this time. --Vital signs and patient stable upon discharge. --Patient ambulatory to lobby with family.       Alsysa Troy RN  03/28/23 6223

## 2023-03-29 ASSESSMENT — ENCOUNTER SYMPTOMS
NAUSEA: 0
VOMITING: 0
GASTROINTESTINAL NEGATIVE: 1

## 2024-09-24 ENCOUNTER — HOSPITAL ENCOUNTER (OUTPATIENT)
Age: 34
Discharge: HOME | End: 2024-09-24
Payer: MEDICAID

## 2024-09-24 ENCOUNTER — HOSPITAL ENCOUNTER (OUTPATIENT)
Dept: GENERAL RADIOLOGY | Age: 34
Discharge: HOME | End: 2024-09-24
Payer: MEDICAID

## 2024-09-24 DIAGNOSIS — M54.2 PAIN IN NECK: ICD-10-CM

## 2024-09-24 DIAGNOSIS — M54.12 RADICULOPATHY OF CERVICAL REGION: ICD-10-CM

## 2024-09-24 DIAGNOSIS — M54.16 LUMBAR RADICULOPATHY: ICD-10-CM

## 2024-09-24 PROCEDURE — 72110 X-RAY EXAM L-2 SPINE 4/>VWS: CPT

## 2024-09-24 PROCEDURE — 72050 X-RAY EXAM NECK SPINE 4/5VWS: CPT
